# Patient Record
Sex: MALE | Race: OTHER | HISPANIC OR LATINO | URBAN - METROPOLITAN AREA
[De-identification: names, ages, dates, MRNs, and addresses within clinical notes are randomized per-mention and may not be internally consistent; named-entity substitution may affect disease eponyms.]

---

## 2019-07-22 ENCOUNTER — INPATIENT (INPATIENT)
Facility: HOSPITAL | Age: 59
LOS: 1 days | Discharge: HOME | DRG: 683 | End: 2019-07-24
Attending: INTERNAL MEDICINE | Admitting: INTERNAL MEDICINE
Payer: COMMERCIAL

## 2019-07-22 VITALS
HEART RATE: 87 BPM | DIASTOLIC BLOOD PRESSURE: 61 MMHG | OXYGEN SATURATION: 98 % | HEIGHT: 66.93 IN | RESPIRATION RATE: 20 BRPM | SYSTOLIC BLOOD PRESSURE: 85 MMHG | TEMPERATURE: 97 F | WEIGHT: 169.98 LBS

## 2019-07-22 DIAGNOSIS — M62.82 RHABDOMYOLYSIS: ICD-10-CM

## 2019-07-22 DIAGNOSIS — R21 RASH AND OTHER NONSPECIFIC SKIN ERUPTION: ICD-10-CM

## 2019-07-22 DIAGNOSIS — I10 ESSENTIAL (PRIMARY) HYPERTENSION: ICD-10-CM

## 2019-07-22 DIAGNOSIS — D72.829 ELEVATED WHITE BLOOD CELL COUNT, UNSPECIFIED: ICD-10-CM

## 2019-07-22 DIAGNOSIS — R74.0 NONSPECIFIC ELEVATION OF LEVELS OF TRANSAMINASE AND LACTIC ACID DEHYDROGENASE [LDH]: ICD-10-CM

## 2019-07-22 DIAGNOSIS — E87.1 HYPO-OSMOLALITY AND HYPONATREMIA: ICD-10-CM

## 2019-07-22 DIAGNOSIS — N17.9 ACUTE KIDNEY FAILURE, UNSPECIFIED: ICD-10-CM

## 2019-07-22 DIAGNOSIS — Z29.9 ENCOUNTER FOR PROPHYLACTIC MEASURES, UNSPECIFIED: ICD-10-CM

## 2019-07-22 DIAGNOSIS — Z90.49 ACQUIRED ABSENCE OF OTHER SPECIFIED PARTS OF DIGESTIVE TRACT: Chronic | ICD-10-CM

## 2019-07-22 LAB
ALBUMIN SERPL ELPH-MCNC: 4.2 G/DL — SIGNIFICANT CHANGE UP (ref 3.5–5)
ALP SERPL-CCNC: 87 U/L — SIGNIFICANT CHANGE UP (ref 40–120)
ALT FLD-CCNC: 131 U/L DA — HIGH (ref 10–60)
ANION GAP SERPL CALC-SCNC: 10 MMOL/L — SIGNIFICANT CHANGE UP (ref 5–17)
ANION GAP SERPL CALC-SCNC: 12 MMOL/L — SIGNIFICANT CHANGE UP (ref 5–17)
APPEARANCE UR: CLEAR — SIGNIFICANT CHANGE UP
AST SERPL-CCNC: 147 U/L — HIGH (ref 10–40)
BASOPHILS # BLD AUTO: 0.04 K/UL — SIGNIFICANT CHANGE UP (ref 0–0.2)
BASOPHILS NFR BLD AUTO: 0.3 % — SIGNIFICANT CHANGE UP (ref 0–2)
BILIRUB SERPL-MCNC: 1.4 MG/DL — HIGH (ref 0.2–1.2)
BILIRUB UR-MCNC: NEGATIVE — SIGNIFICANT CHANGE UP
BUN SERPL-MCNC: 57 MG/DL — HIGH (ref 7–18)
BUN SERPL-MCNC: 70 MG/DL — HIGH (ref 7–18)
CALCIUM SERPL-MCNC: 8 MG/DL — LOW (ref 8.4–10.5)
CALCIUM SERPL-MCNC: 9.1 MG/DL — SIGNIFICANT CHANGE UP (ref 8.4–10.5)
CHLORIDE SERPL-SCNC: 91 MMOL/L — LOW (ref 96–108)
CHLORIDE SERPL-SCNC: 96 MMOL/L — SIGNIFICANT CHANGE UP (ref 96–108)
CK SERPL-CCNC: 3098 U/L — HIGH (ref 35–232)
CO2 SERPL-SCNC: 24 MMOL/L — SIGNIFICANT CHANGE UP (ref 22–31)
CO2 SERPL-SCNC: 24 MMOL/L — SIGNIFICANT CHANGE UP (ref 22–31)
COLOR SPEC: YELLOW — SIGNIFICANT CHANGE UP
CREAT ?TM UR-MCNC: 112 MG/DL — SIGNIFICANT CHANGE UP
CREAT SERPL-MCNC: 1.74 MG/DL — HIGH (ref 0.5–1.3)
CREAT SERPL-MCNC: 2.57 MG/DL — HIGH (ref 0.5–1.3)
DIFF PNL FLD: ABNORMAL
EOSINOPHIL # BLD AUTO: 0.03 K/UL — SIGNIFICANT CHANGE UP (ref 0–0.5)
EOSINOPHIL NFR BLD AUTO: 0.2 % — SIGNIFICANT CHANGE UP (ref 0–6)
GLUCOSE SERPL-MCNC: 125 MG/DL — HIGH (ref 70–99)
GLUCOSE SERPL-MCNC: 145 MG/DL — HIGH (ref 70–99)
GLUCOSE UR QL: NEGATIVE — SIGNIFICANT CHANGE UP
HCT VFR BLD CALC: 46.8 % — SIGNIFICANT CHANGE UP (ref 39–50)
HGB BLD-MCNC: 16.6 G/DL — SIGNIFICANT CHANGE UP (ref 13–17)
IMM GRANULOCYTES NFR BLD AUTO: 1 % — SIGNIFICANT CHANGE UP (ref 0–1.5)
KETONES UR-MCNC: ABNORMAL
LEUKOCYTE ESTERASE UR-ACNC: ABNORMAL
LIDOCAIN IGE QN: 484 U/L — HIGH (ref 73–393)
LYMPHOCYTES # BLD AUTO: 1.93 K/UL — SIGNIFICANT CHANGE UP (ref 1–3.3)
LYMPHOCYTES # BLD AUTO: 15.7 % — SIGNIFICANT CHANGE UP (ref 13–44)
MCHC RBC-ENTMCNC: 30.7 PG — SIGNIFICANT CHANGE UP (ref 27–34)
MCHC RBC-ENTMCNC: 35.5 GM/DL — SIGNIFICANT CHANGE UP (ref 32–36)
MCV RBC AUTO: 86.7 FL — SIGNIFICANT CHANGE UP (ref 80–100)
MONOCYTES # BLD AUTO: 1.16 K/UL — HIGH (ref 0–0.9)
MONOCYTES NFR BLD AUTO: 9.5 % — SIGNIFICANT CHANGE UP (ref 2–14)
NEUTROPHILS # BLD AUTO: 8.99 K/UL — HIGH (ref 1.8–7.4)
NEUTROPHILS NFR BLD AUTO: 73.3 % — SIGNIFICANT CHANGE UP (ref 43–77)
NITRITE UR-MCNC: NEGATIVE — SIGNIFICANT CHANGE UP
NRBC # BLD: 0 /100 WBCS — SIGNIFICANT CHANGE UP (ref 0–0)
PH UR: 5 — SIGNIFICANT CHANGE UP (ref 5–8)
PLATELET # BLD AUTO: 232 K/UL — SIGNIFICANT CHANGE UP (ref 150–400)
POTASSIUM SERPL-MCNC: 3.1 MMOL/L — LOW (ref 3.5–5.3)
POTASSIUM SERPL-MCNC: 3.1 MMOL/L — LOW (ref 3.5–5.3)
POTASSIUM SERPL-SCNC: 3.1 MMOL/L — LOW (ref 3.5–5.3)
POTASSIUM SERPL-SCNC: 3.1 MMOL/L — LOW (ref 3.5–5.3)
POTASSIUM UR-SCNC: 36 MMOL/L — SIGNIFICANT CHANGE UP (ref 25–125)
PROT ?TM UR-MCNC: 20 MG/DL — HIGH (ref 0–12)
PROT SERPL-MCNC: 8 G/DL — SIGNIFICANT CHANGE UP (ref 6–8.3)
PROT UR-MCNC: 30 MG/DL
RBC # BLD: 5.4 M/UL — SIGNIFICANT CHANGE UP (ref 4.2–5.8)
RBC # FLD: 12.4 % — SIGNIFICANT CHANGE UP (ref 10.3–14.5)
SODIUM SERPL-SCNC: 127 MMOL/L — LOW (ref 135–145)
SODIUM SERPL-SCNC: 130 MMOL/L — LOW (ref 135–145)
SODIUM UR-SCNC: 79 MMOL/L — SIGNIFICANT CHANGE UP (ref 40–220)
SP GR SPEC: 1.02 — SIGNIFICANT CHANGE UP (ref 1.01–1.02)
TROPONIN I SERPL-MCNC: <0.015 NG/ML — SIGNIFICANT CHANGE UP (ref 0–0.04)
UROBILINOGEN FLD QL: NEGATIVE — SIGNIFICANT CHANGE UP
WBC # BLD: 12.27 K/UL — HIGH (ref 3.8–10.5)
WBC # FLD AUTO: 12.27 K/UL — HIGH (ref 3.8–10.5)

## 2019-07-22 PROCEDURE — 99285 EMERGENCY DEPT VISIT HI MDM: CPT

## 2019-07-22 PROCEDURE — 74176 CT ABD & PELVIS W/O CONTRAST: CPT | Mod: 26

## 2019-07-22 PROCEDURE — 76700 US EXAM ABDOM COMPLETE: CPT | Mod: 26

## 2019-07-22 RX ORDER — POTASSIUM CHLORIDE 20 MEQ
40 PACKET (EA) ORAL EVERY 4 HOURS
Refills: 0 | Status: COMPLETED | OUTPATIENT
Start: 2019-07-22 | End: 2019-07-22

## 2019-07-22 RX ORDER — SODIUM CHLORIDE 9 MG/ML
1000 INJECTION INTRAMUSCULAR; INTRAVENOUS; SUBCUTANEOUS
Refills: 0 | Status: DISCONTINUED | OUTPATIENT
Start: 2019-07-22 | End: 2019-07-23

## 2019-07-22 RX ORDER — DIPHENHYDRAMINE HCL 50 MG
25 CAPSULE ORAL EVERY 4 HOURS
Refills: 0 | Status: DISCONTINUED | OUTPATIENT
Start: 2019-07-22 | End: 2019-07-24

## 2019-07-22 RX ORDER — HEPARIN SODIUM 5000 [USP'U]/ML
5000 INJECTION INTRAVENOUS; SUBCUTANEOUS EVERY 12 HOURS
Refills: 0 | Status: DISCONTINUED | OUTPATIENT
Start: 2019-07-22 | End: 2019-07-24

## 2019-07-22 RX ORDER — POTASSIUM CHLORIDE 20 MEQ
40 PACKET (EA) ORAL ONCE
Refills: 0 | Status: COMPLETED | OUTPATIENT
Start: 2019-07-22 | End: 2019-07-22

## 2019-07-22 RX ORDER — CEFTRIAXONE 500 MG/1
1000 INJECTION, POWDER, FOR SOLUTION INTRAMUSCULAR; INTRAVENOUS EVERY 24 HOURS
Refills: 0 | Status: DISCONTINUED | OUTPATIENT
Start: 2019-07-22 | End: 2019-07-22

## 2019-07-22 RX ORDER — SODIUM CHLORIDE 9 MG/ML
2000 INJECTION INTRAMUSCULAR; INTRAVENOUS; SUBCUTANEOUS ONCE
Refills: 0 | Status: COMPLETED | OUTPATIENT
Start: 2019-07-22 | End: 2019-07-22

## 2019-07-22 RX ORDER — CALAMINE AND ZINC OXIDE AND PHENOL 160; 10 MG/ML; MG/ML
1 LOTION TOPICAL EVERY 6 HOURS
Refills: 0 | Status: DISCONTINUED | OUTPATIENT
Start: 2019-07-22 | End: 2019-07-24

## 2019-07-22 RX ADMIN — CEFTRIAXONE 100 MILLIGRAM(S): 500 INJECTION, POWDER, FOR SOLUTION INTRAMUSCULAR; INTRAVENOUS at 15:08

## 2019-07-22 RX ADMIN — SODIUM CHLORIDE 2000 MILLILITER(S): 9 INJECTION INTRAMUSCULAR; INTRAVENOUS; SUBCUTANEOUS at 14:33

## 2019-07-22 RX ADMIN — Medication 40 MILLIEQUIVALENT(S): at 15:09

## 2019-07-22 RX ADMIN — HEPARIN SODIUM 5000 UNIT(S): 5000 INJECTION INTRAVENOUS; SUBCUTANEOUS at 18:04

## 2019-07-22 RX ADMIN — SODIUM CHLORIDE 2000 MILLILITER(S): 9 INJECTION INTRAMUSCULAR; INTRAVENOUS; SUBCUTANEOUS at 13:33

## 2019-07-22 RX ADMIN — Medication 40 MILLIEQUIVALENT(S): at 22:11

## 2019-07-22 RX ADMIN — SODIUM CHLORIDE 150 MILLILITER(S): 9 INJECTION INTRAMUSCULAR; INTRAVENOUS; SUBCUTANEOUS at 22:11

## 2019-07-22 RX ADMIN — Medication 25 MILLIGRAM(S): at 22:10

## 2019-07-22 RX ADMIN — SODIUM CHLORIDE 150 MILLILITER(S): 9 INJECTION INTRAMUSCULAR; INTRAVENOUS; SUBCUTANEOUS at 15:09

## 2019-07-22 RX ADMIN — Medication 40 MILLIEQUIVALENT(S): at 18:04

## 2019-07-22 NOTE — ED PROVIDER NOTE - OBJECTIVE STATEMENT
58 y/o M patient, Korean speakers opts to speak English, BIB EMS to the ED for weakness, lightheadedness and feeling unwell at work today. Patient reports 1 week ago he was in another hospital for muscle aches and back aches, had a CAT scan and was informed he had a UTI. Patient was given 2 abx pills and a prescription to the pharmacy but was not able to fill prescription because the pharmacy was closed. Patient denies urinary symptoms, chest pain or any other complaints. NKDA.

## 2019-07-22 NOTE — ED ADULT NURSE NOTE - NSIMPLEMENTINTERV_GEN_ALL_ED
Implemented All Fall with Harm Risk Interventions:  [place] to call system. Call bell, personal items and telephone within reach. Instruct patient to call for assistance. Room bathroom lighting operational. Non-slip footwear when patient is off stretcher. Physically safe environment: no spills, clutter or unnecessary equipment. Stretcher in lowest position, wheels locked, appropriate side rails in place. Provide visual cue, wrist band, yellow gown, etc. Monitor gait and stability. Monitor for mental status changes and reorient to person, place, and time. Review medications for side effects contributing to fall risk. Reinforce activity limits and safety measures with patient and family. Provide visual clues: red socks.

## 2019-07-22 NOTE — H&P ADULT - NSHPPHYSICALEXAM_GEN_ALL_CORE
PHYSICAL EXAM:  GENERAL: male in bed in no acute distress   HEENT: Normocephalic;  conjunctivae and sclerae clear; moist mucous membranes;   NECK : supple  CHEST/LUNG: Clear to auscultation bilaterally with good air entry   HEART: S1 S2  regular; no murmurs, gallops or rubs  ABDOMEN: Soft, Nontender, Nondistended; Bowel sounds present  EXTREMITIES: no cyanosis; no edema; no calf tenderness  SKIN: warm and dry; rash present over shoulders and back   NERVOUS SYSTEM:  Awake and alert; Oriented  to place, person and time ; no new deficits

## 2019-07-22 NOTE — H&P ADULT - HISTORY OF PRESENT ILLNESS
58 yo male with PMH of HTN presented to the ED for weakness, lightheadedness and feeling unwell at work today.      Patient reports 1 week ago he was in another hospital for muscle aches and back aches, had a CAT scan and was informed he had a UTI. Patient was given 2 abx pills and a prescription to the pharmacy but was not able to fill prescription because the pharmacy was closed. Patient denies urinary symptoms, chest pain or any other complaints. NKDA. 60 yo male with PMH of HTN presented to the ED for weakness, lightheadedness and feeling unwell at work today. He is a , worked out doors over the weekend. He denies any pain in particular. He denies any similar complains in the past.    Patient reports 1 week ago he was in [facility] for muscle aches and back aches, had a CAT scan and was informed he had a UTI. Patient was given 2 abx pills and a prescription to the pharmacy but was not able to fill prescription because the pharmacy was closed. Patient denies urinary symptoms, chest pain or any other complaints. He took Advil for back pain.   He complains of rash which developed this morning. Rash associated with itching present on b/l Shoulders and back.   He was told a year and half ago that he has kidney problem but he does not know his baseline creat.

## 2019-07-22 NOTE — H&P ADULT - PROBLEM SELECTOR PLAN 7
IMPROVE VTE Individual Risk Assessment  RISK                                                                Points  [  ] Previous VTE                                                  3  [  ] Thrombophilia                                               2  [  ] Lower limb paralysis                                      2        (unable to hold up >15 seconds)    [  ] Current Cancer                                              2         (within 6 months)  [x  ] Immobilization > 24 hrs                                1  [  ] ICU/CCU stay > 24 hours                              1  [  ] Age > 60                                                      1  IMPROVE VTE Score _________1, heparin for  for DVT proph - Will hold home meds : Telmisartan-HCTZ 80-25 in settingof Citlalli and dehydration   -Monitor BP

## 2019-07-22 NOTE — H&P ADULT - PROBLEM SELECTOR PLAN 2
- Creat; 2.7   -Most likely pre renal, due to Rhabdo   - Patient has ?H/o CKD   -US kidney: no hydronephrosis   - Monitor BMP,   Urine lytes sent

## 2019-07-22 NOTE — H&P ADULT - PROBLEM SELECTOR PLAN 4
Na: 127, most likely due to dehydration   -s/p 2 L NS in Ed   -Repeat NA; 130  -monitor Na  -Encourage PO intake

## 2019-07-22 NOTE — H&P ADULT - NSHPSOCIALHISTORY_GEN_ALL_CORE
occasional alcohol intake? (Language barrier), last alcohol intake was a month ago   denies substance use occasional alcohol intake, last alcohol intake was a month ago   denies substance use

## 2019-07-22 NOTE — ED PROVIDER NOTE - PROGRESS NOTE DETAILS
Labs reveal rhabdo and renal insufficiency. Patient showed me blood work from hospital visit labs reveal creatinine of 3.3. Patient has been told that something was wrong with his kidneys but he is unsure of details. admit for IV hydration. Patient reports working a lot outdoors recently

## 2019-07-22 NOTE — H&P ADULT - ASSESSMENT
58 yo male with PMH of HTN presented to the ED for weakness, lightheadedness and feeling unwell at work today. He is a , worked out doors over the weekend. He denies any pain in particular. He denies any similar complains in the past.        Admitted for Rhabdomyolysis

## 2019-07-22 NOTE — H&P ADULT - NSHPREVIEWOFSYSTEMS_GEN_ALL_CORE
REVIEW OF SYSTEMS:  CONSTITUTIONAL: No fever,   EYES: no acute visual disturbances  NECK: No pain or stiffness  RESPIRATORY: No cough; No shortness of breath  CARDIOVASCULAR: No chest pain, no palpitations  GASTROINTESTINAL: No pain. No nausea or vomiting; No diarrhea   NEUROLOGICAL: No headache or numbness, no tremors  MUSCULOSKELETAL: No joint pain, no muscle pain  GENITOURINARY: no dysuria, no frequency, no hesitancy  PSYCHIATRY: no depression , no anxiety  ALL OTHER  ROS negative

## 2019-07-22 NOTE — H&P ADULT - PROBLEM SELECTOR PLAN 8
IMPROVE VTE Individual Risk Assessment  RISK                                                                Points  [  ] Previous VTE                                                  3  [  ] Thrombophilia                                               2  [  ] Lower limb paralysis                                      2        (unable to hold up >15 seconds)    [  ] Current Cancer                                              2         (within 6 months)  [x  ] Immobilization > 24 hrs                                1  [  ] ICU/CCU stay > 24 hours                              1  [  ] Age > 60                                                      1  IMPROVE VTE Score _________1, heparin for  for DVT proph

## 2019-07-22 NOTE — H&P ADULT - PROBLEM SELECTOR PLAN 6
LFT: elevated, could be due to Rhabdomyolysis   -CT  abd: Cirrhosis   - denies h/o heavy drinking presently   - Hepatitis Panel testing

## 2019-07-22 NOTE — H&P ADULT - PROBLEM SELECTOR PLAN 1
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   - CPK: 3K   s/p 2 L NS in ED   -IV fluids: 150 cc /ml   - most likely to dehydration as he was working outside over the weekend.   - REpeat CPK in Am   - Will also send Aldolase, ESR, CPK, SHERITA

## 2019-07-22 NOTE — ED ADULT NURSE NOTE - OBJECTIVE STATEMENT
Pt complaining of weakness, altered mental statues for a couple of minutes, blurred vision when he was at work this morning.

## 2019-07-22 NOTE — ED ADULT TRIAGE NOTE - CHIEF COMPLAINT QUOTE
Weakness, cold sweats, fatigue x 20 mins ago. Noted by Co-workers. Dx with Kidney infection x last Saturday. On antibiotics.

## 2019-07-23 LAB
24R-OH-CALCIDIOL SERPL-MCNC: 22.1 NG/ML — LOW (ref 30–80)
ALBUMIN SERPL ELPH-MCNC: 3.1 G/DL — LOW (ref 3.5–5)
ALP SERPL-CCNC: 70 U/L — SIGNIFICANT CHANGE UP (ref 40–120)
ALT FLD-CCNC: 87 U/L DA — HIGH (ref 10–60)
ANION GAP SERPL CALC-SCNC: 7 MMOL/L — SIGNIFICANT CHANGE UP (ref 5–17)
AST SERPL-CCNC: 75 U/L — HIGH (ref 10–40)
BASOPHILS # BLD AUTO: 0.02 K/UL — SIGNIFICANT CHANGE UP (ref 0–0.2)
BASOPHILS NFR BLD AUTO: 0.2 % — SIGNIFICANT CHANGE UP (ref 0–2)
BILIRUB SERPL-MCNC: 0.9 MG/DL — SIGNIFICANT CHANGE UP (ref 0.2–1.2)
BUN SERPL-MCNC: 40 MG/DL — HIGH (ref 7–18)
C3 SERPL-MCNC: 91 MG/DL — SIGNIFICANT CHANGE UP (ref 81–157)
C4 SERPL-MCNC: 25 MG/DL — SIGNIFICANT CHANGE UP (ref 13–39)
CALCIUM SERPL-MCNC: 7.8 MG/DL — LOW (ref 8.4–10.5)
CALCIUM UR-MCNC: 1 MG/DL — SIGNIFICANT CHANGE UP
CHLORIDE SERPL-SCNC: 103 MMOL/L — SIGNIFICANT CHANGE UP (ref 96–108)
CHOLEST SERPL-MCNC: 120 MG/DL — SIGNIFICANT CHANGE UP (ref 10–199)
CK SERPL-CCNC: 939 U/L — HIGH (ref 35–232)
CO2 SERPL-SCNC: 23 MMOL/L — SIGNIFICANT CHANGE UP (ref 22–31)
CREAT SERPL-MCNC: 1.23 MG/DL — SIGNIFICANT CHANGE UP (ref 0.5–1.3)
CRP SERPL-MCNC: 0.28 MG/DL — SIGNIFICANT CHANGE UP (ref 0–0.4)
CULTURE RESULTS: NO GROWTH — SIGNIFICANT CHANGE UP
EOSINOPHIL # BLD AUTO: 0.06 K/UL — SIGNIFICANT CHANGE UP (ref 0–0.5)
EOSINOPHIL NFR BLD AUTO: 0.7 % — SIGNIFICANT CHANGE UP (ref 0–6)
ERYTHROCYTE [SEDIMENTATION RATE] IN BLOOD: 2 MM/HR — SIGNIFICANT CHANGE UP (ref 0–20)
FOLATE SERPL-MCNC: 8.7 NG/ML — SIGNIFICANT CHANGE UP
GLUCOSE SERPL-MCNC: 92 MG/DL — SIGNIFICANT CHANGE UP (ref 70–99)
HAV IGM SER-ACNC: SIGNIFICANT CHANGE UP
HBA1C BLD-MCNC: 5.7 % — HIGH (ref 4–5.6)
HBV CORE IGM SER-ACNC: SIGNIFICANT CHANGE UP
HBV SURFACE AG SER-ACNC: SIGNIFICANT CHANGE UP
HCT VFR BLD CALC: 42.9 % — SIGNIFICANT CHANGE UP (ref 39–50)
HCV AB S/CO SERPL IA: 0.11 S/CO — SIGNIFICANT CHANGE UP (ref 0–0.99)
HCV AB SERPL-IMP: SIGNIFICANT CHANGE UP
HDLC SERPL-MCNC: 63 MG/DL — SIGNIFICANT CHANGE UP
HGB BLD-MCNC: 14.9 G/DL — SIGNIFICANT CHANGE UP (ref 13–17)
IMM GRANULOCYTES NFR BLD AUTO: 1 % — SIGNIFICANT CHANGE UP (ref 0–1.5)
LIDOCAIN IGE QN: 349 U/L — SIGNIFICANT CHANGE UP (ref 73–393)
LIPID PNL WITH DIRECT LDL SERPL: 39 MG/DL — SIGNIFICANT CHANGE UP
LYMPHOCYTES # BLD AUTO: 2.24 K/UL — SIGNIFICANT CHANGE UP (ref 1–3.3)
LYMPHOCYTES # BLD AUTO: 27.9 % — SIGNIFICANT CHANGE UP (ref 13–44)
MAGNESIUM SERPL-MCNC: 2.2 MG/DL — SIGNIFICANT CHANGE UP (ref 1.6–2.6)
MCHC RBC-ENTMCNC: 30.8 PG — SIGNIFICANT CHANGE UP (ref 27–34)
MCHC RBC-ENTMCNC: 34.7 GM/DL — SIGNIFICANT CHANGE UP (ref 32–36)
MCV RBC AUTO: 88.8 FL — SIGNIFICANT CHANGE UP (ref 80–100)
MONOCYTES # BLD AUTO: 0.91 K/UL — HIGH (ref 0–0.9)
MONOCYTES NFR BLD AUTO: 11.3 % — SIGNIFICANT CHANGE UP (ref 2–14)
NEUTROPHILS # BLD AUTO: 4.72 K/UL — SIGNIFICANT CHANGE UP (ref 1.8–7.4)
NEUTROPHILS NFR BLD AUTO: 58.9 % — SIGNIFICANT CHANGE UP (ref 43–77)
NRBC # BLD: 0 /100 WBCS — SIGNIFICANT CHANGE UP (ref 0–0)
OSMOLALITY UR: 715 MOSM/KG — SIGNIFICANT CHANGE UP (ref 50–1200)
PHOSPHATE SERPL-MCNC: 1.6 MG/DL — LOW (ref 2.5–4.5)
PLATELET # BLD AUTO: 174 K/UL — SIGNIFICANT CHANGE UP (ref 150–400)
POTASSIUM SERPL-MCNC: 3.7 MMOL/L — SIGNIFICANT CHANGE UP (ref 3.5–5.3)
POTASSIUM SERPL-SCNC: 3.7 MMOL/L — SIGNIFICANT CHANGE UP (ref 3.5–5.3)
PROT SERPL-MCNC: 6.3 G/DL — SIGNIFICANT CHANGE UP (ref 6–8.3)
RBC # BLD: 4.83 M/UL — SIGNIFICANT CHANGE UP (ref 4.2–5.8)
RBC # FLD: 12.5 % — SIGNIFICANT CHANGE UP (ref 10.3–14.5)
SODIUM SERPL-SCNC: 133 MMOL/L — LOW (ref 135–145)
SPECIMEN SOURCE: SIGNIFICANT CHANGE UP
TOTAL CHOLESTEROL/HDL RATIO MEASUREMENT: 1.9 RATIO — LOW (ref 3.4–9.6)
TRIGL SERPL-MCNC: 91 MG/DL — SIGNIFICANT CHANGE UP (ref 10–149)
TSH SERPL-MCNC: 3.08 UU/ML — SIGNIFICANT CHANGE UP (ref 0.34–4.82)
URATE UR-MCNC: 64.7 MG/DL — SIGNIFICANT CHANGE UP
UUN UR-MCNC: 1131 MG/DL — SIGNIFICANT CHANGE UP
VIT B12 SERPL-MCNC: >2000 PG/ML — HIGH (ref 232–1245)
WBC # BLD: 8.03 K/UL — SIGNIFICANT CHANGE UP (ref 3.8–10.5)
WBC # FLD AUTO: 8.03 K/UL — SIGNIFICANT CHANGE UP (ref 3.8–10.5)

## 2019-07-23 RX ORDER — POTASSIUM CHLORIDE 20 MEQ
20 PACKET (EA) ORAL ONCE
Refills: 0 | Status: COMPLETED | OUTPATIENT
Start: 2019-07-23 | End: 2019-07-23

## 2019-07-23 RX ORDER — POTASSIUM PHOSPHATE, MONOBASIC POTASSIUM PHOSPHATE, DIBASIC 236; 224 MG/ML; MG/ML
15 INJECTION, SOLUTION INTRAVENOUS ONCE
Refills: 0 | Status: COMPLETED | OUTPATIENT
Start: 2019-07-23 | End: 2019-07-23

## 2019-07-23 RX ORDER — SODIUM CHLORIDE 9 MG/ML
1000 INJECTION INTRAMUSCULAR; INTRAVENOUS; SUBCUTANEOUS
Refills: 0 | Status: DISCONTINUED | OUTPATIENT
Start: 2019-07-23 | End: 2019-07-24

## 2019-07-23 RX ORDER — ERGOCALCIFEROL 1.25 MG/1
50000 CAPSULE ORAL
Refills: 0 | Status: DISCONTINUED | OUTPATIENT
Start: 2019-07-23 | End: 2019-07-24

## 2019-07-23 RX ORDER — SODIUM CHLORIDE 9 MG/ML
1000 INJECTION INTRAMUSCULAR; INTRAVENOUS; SUBCUTANEOUS
Refills: 0 | Status: DISCONTINUED | OUTPATIENT
Start: 2019-07-23 | End: 2019-07-23

## 2019-07-23 RX ADMIN — SODIUM CHLORIDE 150 MILLILITER(S): 9 INJECTION INTRAMUSCULAR; INTRAVENOUS; SUBCUTANEOUS at 08:55

## 2019-07-23 RX ADMIN — POTASSIUM PHOSPHATE, MONOBASIC POTASSIUM PHOSPHATE, DIBASIC 62.5 MILLIMOLE(S): 236; 224 INJECTION, SOLUTION INTRAVENOUS at 11:02

## 2019-07-23 RX ADMIN — Medication 20 MILLIEQUIVALENT(S): at 11:03

## 2019-07-23 RX ADMIN — SODIUM CHLORIDE 150 MILLILITER(S): 9 INJECTION INTRAMUSCULAR; INTRAVENOUS; SUBCUTANEOUS at 05:39

## 2019-07-23 RX ADMIN — HEPARIN SODIUM 5000 UNIT(S): 5000 INJECTION INTRAVENOUS; SUBCUTANEOUS at 05:39

## 2019-07-23 RX ADMIN — ERGOCALCIFEROL 50000 UNIT(S): 1.25 CAPSULE ORAL at 18:12

## 2019-07-23 RX ADMIN — HEPARIN SODIUM 5000 UNIT(S): 5000 INJECTION INTRAVENOUS; SUBCUTANEOUS at 18:12

## 2019-07-23 NOTE — CONSULT NOTE ADULT - ASSESSMENT
1. Atelectasis  - Bronchodilators PRN  - Incentive Spirometry  - Chest PT  - PFTs as OP.     2. Rhabdomyolysis  - Likely 2nd to dehydration   - CK improved from 3098 to 939.  - BUN/Creat improved.   - Cont. IVF  - Monitor labs.   - Avoid nephrotoxic drugs.   - DVT and GI PPX     3. Elevated LFTs  - Monitor LFTs   - CT noted; R/O cirrhosis  - US hepatic to further eval.   - Cont. fluids
A/P:  1.DEE: secondary to pre-renal azotemia, secondary to arb medication with hctz plus hemodynamic related injury from Advil while pt is having dehydration  -pts renal function is improving with current care  -suggest to continue iv hydration  R/O CKD ,secondary to htn with mild proteinuria  -needs outpatient renal f/u  -hold Thelmasartan/hctz for now  -may add another anti htn medication if bp goes high   -Keep patient euvolemic and renal diet  -Avoid Nephrotoxic Meds/ Agents such as (NSAIDs, IV contrast, Aminoglycosides such as gentamicin, -Gadolinium contrast, Phosphate containing enemas, etc..)  -Adjust Medications according to eGFR  -f/u bmp daily  2.HYPONATREMIA: secondary to Hctz , now improving ,less other cause   -f/u Na level daily  -avoid Hctz   3.HYPOKALEMIA: Secondary to hctz, now improved  -avoid Hctz  4.HX OF HTN: currently bp is low normal  -keep bp >110/70 and <130/80  5.NODULAR LIVER/FATTY LIVER : with abnormal LFTs   -suggest GI consult

## 2019-07-23 NOTE — PROGRESS NOTE ADULT - PROBLEM SELECTOR PLAN 1
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   - CPK: 3K -->939  s/p 2 L NS in ED   -IV fluids started with 150 , cw 150/hr  - REpeat CPK in Am   - []FU Aldolase, ESR, CPK, SHERITA

## 2019-07-23 NOTE — PROGRESS NOTE ADULT - SUBJECTIVE AND OBJECTIVE BOX
Patient is a 59y old  Male who presents with a chief complaint of generalized body weakness (23 Jul 2019 13:24)      INTERVAL HPI/OVERNIGHT EVENTS: pt seen and examined, no overnight acute events, symptomatically back to his baseline. no urinary retention or constipation.         REVIEW OF SYSTEMS:  CONSTITUTIONAL: No fever, weight loss, or fatigue  EYES: No eye pain, visual disturbances, or discharge  ENMT:  No difficulty hearing, tinnitus, vertigo; No sinus or throat pain  NECK: No pain or stiffness  BREASTS: No pain, masses, or nipple discharge  RESPIRATORY: No cough, wheezing, chills or hemoptysis; No shortness of breath  CARDIOVASCULAR: No chest pain, palpitations, dizziness, or leg swelling  GASTROINTESTINAL: No abdominal or epigastric pain. No nausea, vomiting, or hematemesis; No diarrhea or constipation. No melena or hematochezia.  GENITOURINARY: No dysuria, frequency, hematuria, or incontinence  NEUROLOGICAL: No headaches, memory loss, loss of strength, numbness, or tremors  SKIN: No itching, burning, rashes, or lesions   LYMPH NODES: No enlarged glands  ENDOCRINE: No heat or cold intolerance; No hair loss  MUSCULOSKELETAL: No joint pain or swelling; No muscle, back, or extremity pain  HEME/LYMPH: No easy bruising, or bleeding gums  ALLERY AND IMMUNOLOGIC: No hives or eczema    FAMILY HISTORY:  No pertinent family history in first degree relatives    Vital Signs Last 24 Hrs  T(C): 36.4 (23 Jul 2019 08:04), Max: 36.8 (22 Jul 2019 15:50)  T(F): 97.6 (23 Jul 2019 08:04), Max: 98.2 (22 Jul 2019 15:50)  HR: 67 (23 Jul 2019 08:04) (67 - 79)  BP: 104/65 (23 Jul 2019 08:04) (100/67 - 127/80)  BP(mean): --  RR: 16 (23 Jul 2019 08:04) (16 - 16)  SpO2: 100% (23 Jul 2019 08:04) (100% - 100%)      PHYSICAL EXAM:  GENERAL: NAD, well-groomed, well-developed  HEAD:  Atraumatic, Normocephalic  EYES: EOMI, PERRLA, conjunctiva and sclera clear  ENMT: No tonsillar erythema, exudates, or enlargement; Moist mucous membranes, Good dentition, No lesions  NECK: Supple, No JVD, Normal thyroid  NERVOUS SYSTEM:  Alert & Oriented X3, Good concentration; Motor Strength 5/5 B/L upper and lower extremities; DTRs 2+ intact and symmetric  CHEST/LUNG: Clear to percussion bilaterally; No rales, rhonchi, wheezing, or rubs  HEART: Regular rate and rhythm; No murmurs, rubs, or gallops  ABDOMEN: Soft, Nontender, Nondistended; Bowel sounds present  EXTREMITIES:  2+ Peripheral Pulses, No clubbing, cyanosis, or edema  LYMPH: No lymphadenopathy noted  SKIN: No rashes or lesions    Consultant(s) Notes Reviewed:  [x ] YES  [ ] NO  Care Discussed with Consultants/Other Providers [ x] YES  [ ] NO    LABS:                        14.9   8.03  )-----------( 174      ( 23 Jul 2019 06:42 )             42.9   07-23    133<L>  |  103  |  40<H>  ----------------------------<  92  3.7   |  23  |  1.23    Ca    7.8<L>      23 Jul 2019 06:42  Phos  1.6     07-23  Mg     2.2     07-23    TPro  6.3  /  Alb  3.1<L>  /  TBili  0.9  /  DBili  x   /  AST  75<H>  /  ALT  87<H>  /  AlkPhos  70  07-23          RADIOLOGY & ADDITIONAL TESTS:  < from: CT Abdomen and Pelvis No Cont (07.22.19 @ 14:46) >  Findings: Limited sections through the lung bases demonstrate small left   atelectasis.    The examination is limited by lack of IV and oral contrast   Cholecystectomy clips are present. Nodular contour of the liver,   suggestive of cirrhosis. Allowing for the noncontrast technique, the   common bile duct, pancreas, spleen, adrenals and kidneys appear grossly   unremarkable.    The appendix appears normal. Small collapsed hiatal hernia versus   possible distal esophageal mural thickening. EGD may be pursued for   further evaluation. No bowel obstruction.    No evidence for free air, ascites, or enlarged lymph node.    The urinary bladder appears grossly unremarkable. The prostate is mildly   enlarged.    Impression: No CT evidence for acute pancreatitis.    Nodular contour of the liver, suggestive of cirrhosis.     Small collapsed hiatal hernia versus possible distal esophageal mural   thickening. EGD may be pursued for further evaluation.     < from: US Abdomen Complete (07.22.19 @ 15:58) >  IMPRESSION:  Fatty liver      Imaging Personally Reviewed:  [x ] YES  [ ] NO  calamine Lotion 1 Application(s) Topical every 6 hours PRN  diphenhydrAMINE 25 milliGRAM(s) Oral every 4 hours PRN  heparin  Injectable 5000 Unit(s) SubCutaneous every 12 hours  sodium chloride 0.9%. 1000 milliLiter(s) IV Continuous <Continuous>      HEALTH ISSUES - PROBLEM Dx:  Hypertension, unspecified type: Hypertension, unspecified type  Rash: Rash  Prophylactic measure: Prophylactic measure  Transaminitis: Transaminitis  Elevated WBC count: Elevated WBC count  Hyponatremia: Hyponatremia  DEE (acute kidney injury): DEE (acute kidney injury)  Rhabdomyolysis: Rhabdomyolysis

## 2019-07-23 NOTE — PROGRESS NOTE ADULT - SUBJECTIVE AND OBJECTIVE BOX
60 yo male with PMH of HTN presented to the ED for weakness, lightheadedness and feeling unwell at work today. He is a , worked out doors over the weekend. He denies any pain in particular. He denies any similar complains in the past.    Patient reports 1 week ago he was in [facility] for muscle aches and back aches, had a CAT scan and was informed he had a UTI. Patient was given 2 abx pills and a prescription to the pharmacy but was not able to fill prescription because the pharmacy was closed. Patient denies urinary symptoms, chest pain or any other complaints. He took Advil for back pain.   He complains of rash which developed this morning. Rash associated with itching present on b/l Shoulders and back.   He was told a year and half ago that he has kidney problem but he does not know his baseline creat.     pt seen in icu [  ], reg med floor [ x  ], bed [ x ], chair at bedside [   ], a+o x3 [ x ], lethargic [  ],  nad [  ]          Allergies    No Known Allergies        Vitals    T(F): 97.6 (07-23-19 @ 08:04), Max: 98.2 (07-22-19 @ 15:50)  HR: 67 (07-23-19 @ 08:04) (67 - 79)  BP: 104/65 (07-23-19 @ 08:04) (100/67 - 127/80)  RR: 16 (07-23-19 @ 08:04) (16 - 16)  SpO2: 100% (07-23-19 @ 08:04) (100% - 100%)  Wt(kg): --  CAPILLARY BLOOD GLUCOSE          Labs                          14.9   8.03  )-----------( 174      ( 23 Jul 2019 06:42 )             42.9       07-23    133<L>  |  103  |  40<H>  ----------------------------<  92  3.7   |  23  |  1.23    Ca    7.8<L>      23 Jul 2019 06:42  Phos  1.6     07-23  Mg     2.2     07-23    TPro  6.3  /  Alb  3.1<L>  /  TBili  0.9  /  DBili  x   /  AST  75<H>  /  ALT  87<H>  /  AlkPhos  70  07-23      CARDIAC MARKERS ( 23 Jul 2019 06:42 )  x     / x     / 939 U/L / x     / x      CARDIAC MARKERS ( 22 Jul 2019 11:08 )  <0.015 ng/mL / x     / 3098 U/L / x     / x                Radiology Results    < from: US Abdomen Complete (07.22.19 @ 15:58) >  IMPRESSION:  Fatty liver    < end of copied text >      < from: CT Abdomen and Pelvis No Cont (07.22.19 @ 14:46) >    Impression: No CT evidence for acute pancreatitis.    Nodular contour of the liver, suggestive of cirrhosis.     Small collapsed hiatal hernia versus possible distal esophageal mural   thickening. EGD may be pursued for further evaluation.     < end of copied text >          Meds    MEDICATIONS  (STANDING):  heparin  Injectable 5000 Unit(s) SubCutaneous every 12 hours  sodium chloride 0.9%. 1000 milliLiter(s) (150 mL/Hr) IV Continuous <Continuous>      MEDICATIONS  (PRN):  calamine Lotion 1 Application(s) Topical every 6 hours PRN Itching  diphenhydrAMINE 25 milliGRAM(s) Oral every 4 hours PRN Rash and/or Itching      Physical Exam    Neuro :  no focal deficits  Respiratory: CTA B/L  CV: RRR, S1S2, no murmurs,   Abdominal: Soft, NT, ND +BS,  Extremities: No edema, + peripheral pulses    ASSESSMENT    Rhabdomyolysis possibly 2nd to dehydration and exertion   harriet resolved   h/o Hypertension, unspecified type  S/P cholecystectomy        PLAN      cont IV fluids  most likely to dehydration as he was working outside over the weekend.   CK improving   ast/alt improving   ct abd noted  abd us noted   bp control  electrolyte management  continue current meds

## 2019-07-23 NOTE — PROGRESS NOTE ADULT - PROBLEM SELECTOR PLAN 2
- Creat; 2.7 -->1.23  -Most likely was pre renal, due to Rhabdo   -US kidney: no hydronephrosis   - Monitor BMP,   Urine lytes noted  Nephro consult: recom GI consult as per abnormal LFT , hold Thelmasartan/hctz for now  -may add another anti htn medication if bp goes high , like Norvasc

## 2019-07-23 NOTE — CONSULT NOTE ADULT - SUBJECTIVE AND OBJECTIVE BOX
PULMONARY CONSULT NOTE      KRISTEN ZELAYA  MRN-[id number]    Patient is a 59y old  Male who presents with a chief complaint of generalized body weakness (2019 10:06)      HISTORY OF PRESENT ILLNESS:  History of Present Illness:  Reason for Admission: generalized body weakness	  History of Present Illness: 	  58 yo male with PMH of HTN presented to the ED for weakness, lightheadedness and feeling unwell at work today. He is a , worked out doors over the weekend. He denies any pain in particular. He denies any similar complains in the past.    Patient reports 1 week ago he was in [facility] for muscle aches and back aches, had a CAT scan and was informed he had a UTI. Patient was given 2 abx pills and a prescription to the pharmacy but was not able to fill prescription because the pharmacy was closed. Patient denies urinary symptoms, chest pain or any other complaints. He took Advil for back pain.   He complains of rash which developed this morning. Rash associated with itching present on b/l Shoulders and back.   He was told a year and half ago that he has kidney problem but he does not know his baseline creat.       Pt is awake, alert, lying in bed in NAD. Denies smoking hx, denies hx of asthma/COPD. Reports he was having difficulty due to heat and weakness taking full deep breaths.     MEDICATIONS  (STANDING):  heparin  Injectable 5000 Unit(s) SubCutaneous every 12 hours  sodium chloride 0.9%. 1000 milliLiter(s) (150 mL/Hr) IV Continuous <Continuous>      MEDICATIONS  (PRN):  calamine Lotion 1 Application(s) Topical every 6 hours PRN Itching  diphenhydrAMINE 25 milliGRAM(s) Oral every 4 hours PRN Rash and/or Itching      Allergies    No Known Allergies    Intolerances        PAST MEDICAL & SURGICAL HISTORY:  Hypertension, unspecified type  S/P cholecystectomy      FAMILY HISTORY:  No pertinent family history in first degree relatives      SOCIAL HISTORY  Smoking History:     REVIEW OF SYSTEMS:    CONSTITUTIONAL:  No fevers, chills, sweats    HEENT:  Eyes:  No diplopia or blurred vision. ENT:  No earache, sore throat or runny nose.    CARDIOVASCULAR:  No pressure, squeezing, tightness, or heaviness about the chest; no palpitations.    RESPIRATORY:  Per HPI    GASTROINTESTINAL:  No abdominal pain, nausea, vomiting or diarrhea.    GENITOURINARY:  No dysuria, frequency or urgency.    NEUROLOGIC:  No paresthesias, fasciculations, seizures or weakness.    PSYCHIATRIC:  No disorder of thought or mood.    Vital Signs Last 24 Hrs  T(C): 36.4 (2019 08:04), Max: 36.8 (2019 15:50)  T(F): 97.6 (2019 08:04), Max: 98.2 (2019 15:50)  HR: 67 (2019 08:04) (67 - 79)  BP: 104/65 (2019 08:04) (100/67 - 127/80)  BP(mean): --  RR: 16 (2019 08:04) (16 - 16)  SpO2: 100% (2019 08:04) (100% - 100%)  I&O's Detail      PHYSICAL EXAMINATION:    GENERAL: The patient is a well-developed, well-nourished _____in no apparent distress.     HEENT: Head is normocephalic and atraumatic. Extraocular muscles are intact. Mucous membranes are moist.     NECK: Supple.     LUNGS: Clear to auscultation without wheezing, rales, or rhonchi. Respirations unlabored    HEART: Regular rate and rhythm without murmur.    ABDOMEN: Soft, nontender, and nondistended.  No hepatosplenomegaly is noted.    EXTREMITIES: Without any cyanosis, clubbing, rash, lesions or edema.    NEUROLOGIC: Grossly intact.      LABS:                        14.9   8.03  )-----------( 174      ( 2019 06:42 )             42.9     07-23  Comprehensive Metabolic Panel (19 @ 11:08)    Creatinine, Serum: 2.57 mg/dL    Creatine Kinase, Serum (19 @ 11:08)    Creatine Kinase, Serum: 3098 U/L    Basic Metabolic Panel - STAT (19 @ 18:15)    Creatinine, Serum: 1.74 mg/dL    Creatinine, Random Urine (19 @ 18:52)    Creatinine, Random Urine: 112: Reference Ranges have NOT been established for random urine analytes due  to variability in fluid intake and concentration. mg/dL    Comprehensive Metabolic Panel (19 @ 06:42)    Creatinine, Serum: 1.23 mg/dL    Creatine Kinase, Serum in AM (19 @ 06:42)    Creatine Kinase, Serum: 939 U/L      133<L>  |  103  |  40<H>  ----------------------------<  92  3.7   |  23  |  1.23    Ca    7.8<L>      2019 06:42  Phos  1.6       Mg     2.2         TPro  6.3  /  Alb  3.1<L>  /  TBili  0.9  /  DBili  x   /  AST  75<H>  /  ALT  87<H>  /  AlkPhos  70        Urinalysis Basic - ( 2019 11:32 )    Color: Yellow / Appearance: Clear / S.020 / pH: x  Gluc: x / Ketone: Trace  / Bili: Negative / Urobili: Negative   Blood: x / Protein: 30 mg/dL / Nitrite: Negative   Leuk Esterase: Trace / RBC: 0-2 /HPF / WBC 3-5 /HPF   Sq Epi: x / Non Sq Epi: Few /HPF / Bacteria: Few /HPF        CARDIAC MARKERS ( 2019 06:42 )  x     / x     / 939 U/L / x     / x      CARDIAC MARKERS ( 2019 11:08 )  <0.015 ng/mL / x     / 3098 U/L / x     / x                    MICROBIOLOGY:    RADIOLOGY & ADDITIONAL STUDIES:    CXR:    Ct scan chest:    < from: CT Abdomen and Pelvis No Cont (19 @ 14:46) >  Findings: Limited sections through the lung bases demonstrate small left   atelectasis.    < end of copied text >    < from: CT Abdomen and Pelvis No Cont (19 @ 14:46) >  Impression: No CT evidence for acute pancreatitis.    Nodular contour of the liver, suggestive of cirrhosis.     Small collapsed hiatal hernia versus possible distal esophageal mural   thickening. EGD may be pursued for further evaluation.       < end of copied text >    ekg;    echo:

## 2019-07-23 NOTE — CONSULT NOTE ADULT - SUBJECTIVE AND OBJECTIVE BOX
Patient is a 59y Male whom presented to the hospital with     HPI:  60 yo male with PMH of HTN presented to the ED for weakness, lightheadedness and feeling unwell at work today. He is a , worked out doors over the weekend. He denies any pain in particular. He denies any similar complains in the past.    Patient reports 1 week ago he was in [facility] for muscle aches and back aches, had a CAT scan and was informed he had a UTI. Patient was given 2 abx pills and a prescription to the pharmacy but was not able to fill prescription because the pharmacy was closed. Patient denies urinary symptoms, chest pain or any other complaints. He took Advil for back pain.   He complains of rash which developed this morning. Rash associated with itching present on b/l Shoulders and back.   He was told a year and half ago that he has kidney problem but he does not know his baseline creat. (2019 14:20)    pts current chart reviewed and case discussed with resident  pt denies pmh of renal ds, proteinuria, renal stones, recurrent uti or nephrotic edema or nephrotic syndrome  pt give pmh of retinopathy and s/p laser treatment  pt denies Nsaids use or otc other nephrotoxic supplements  PAST MEDICAL & SURGICAL HISTORY:  Hypertension, unspecified type  S/P cholecystectomy      Home Medications: Reviewed    MEDICATIONS  (STANDING):  heparin  Injectable 5000 Unit(s) SubCutaneous every 12 hours  sodium chloride 0.9%. 1000 milliLiter(s) (150 mL/Hr) IV Continuous <Continuous>    MEDICATIONS  (PRN):  calamine Lotion 1 Application(s) Topical every 6 hours PRN Itching  diphenhydrAMINE 25 milliGRAM(s) Oral every 4 hours PRN Rash and/or Itching      Allergies    No Known Allergies    Intolerances        SOCIAL HISTORY:  Alcohol use [X ] No  [ ] Yes  Smoking  [ X] No  [ ] Yes  Drug Abuse [X ] No  [ ] Yes  Tattoo [X ] No  [ ] Yes  History of Blood transfusion [ X] No  [ ] Yes    FAMILY HISTORY:  No pertinent family history in first degree relatives      Diabetes [ ]  Hypertension [ ]  Kidney Stones [ ]  Heart Disease [ ]  Hyperlipidemia [ ]  Cancer [ ]    REVIEW OF SYSTEMS:  CONSTITUTIONAL: No weakness, fevers or chills  EYES/ENT: No visual changes;  No vertigo or throat pain   NECK: No pain or stiffness  RESPIRATORY: No cough, wheezing, hemoptysis; No shortness of breath  CARDIOVASCULAR: No chest pain or palpitations  GASTROINTESTINAL: No abdominal or epigastric pain. No nausea, vomiting, or hematemesis; No diarrhea or constipation. No melena or hematochezia.  GENITOURINARY: No dysuria, frequency or hematuria  NEUROLOGICAL: No numbness or weakness  SKIN: No itching, burning, rashes, or lesions   All other review of systems is negative unless indicated above    Vital Signs  T(F): 97.6 (19 @ 08:04), Max: 98.2 (19 @ 15:50)  HR: 67 (19 @ 08:04) (67 - 79)  BP: 104/65 (19 @ 08:04) (100/67 - 127/80)  ABP: --  RR: 16 (19 @ 08:04) (16 - 16)  SpO2: 100% (19 @ 08:04) (100% - 100%)  Wt(kg): --  CVP(cm H2O): --  CO: --  PCWP: --    I and O's:    Daily     Daily     PHYSICAL EXAM:  Constitutional: well developed, well nourished  and in nad  HEENT: PERRLA,  no icteric sclera and mild pallor of conjunctiva noted  Neck: No JVD, thyromegaly or adenopathy  Respiratory: reduced air entry lower lungs with no rales, wheezing or rhonchi  Cardiovascular: S1 and S2 normally heard  Gastrointestinal: soft, nondistended, nontender and normal bowel sounds heard  Extremities: No peripheral edema or cyanosis  Neurological: A/O x 3, no focal deficits  Psychiatric: Normal mood, normal affect  : No flank tenderness  Skin: No rashes        LABS:                        14.9   8.03  )-----------( 174      ( 2019 06:42 )             42.9     1.6  --            133<L>  |  103  |  40<H>  ----------------------------<  92  3.7   |  23  |  1.23      130<L>  |  96  |  57<H>  ----------------------------<  145<H>  3.1<L>   |  24  |  1.74<H>      127<L>  |  91<L>  |  70<H>  ----------------------------<  125<H>  3.1<L>   |  24  |  2.57<H>    Ca    7.8<L>      2019 06:42  Ca    8.0<L>      2019 18:15  Ca    9.1      2019 11:08  Phos  1.6       Mg     2.2         TPro  6.3  /  Alb  3.1<L>  /  TBili  0.9  /  DBili  x   /  AST  75<H>  /  ALT  87<H>  /  AlkPhos  70    TPro  8.0  /  Alb  4.2  /  TBili  1.4<H>  /  DBili  x   /  AST  147<H>  /  ALT  131<H>  /  AlkPhos  87            URINE STUDIES:  Urinalysis Basic - ( 2019 11:32 )    Color: Yellow / Appearance: Clear / S.020 / pH: x  Gluc: x / Ketone: Trace  / Bili: Negative / Urobili: Negative   Blood: x / Protein: 30 mg/dL / Nitrite: Negative   Leuk Esterase: Trace / RBC: 0-2 /HPF / WBC 3-5 /HPF   Sq Epi: x / Non Sq Epi: Few /HPF / Bacteria: Few /HPF        Calcium, Random Urine: 1.0 mg/dL (19 @ 04:16)  Osmolality, Random Urine: 715 mosm/Kg (19 @ 18:52)  Sodium, Random Urine: 79 mmol/L (19 @ 18:52)  Potassium, Random Urine: 36 mmol/L (19 @ 18:52)  Creatinine, Random Urine: 112 mg/dL (19 @ 18:52)                RADIOLOGY & ADDITIONAL STUDIES: Patient is a 59y Male whom presented to the hospital with weakness and lightheadedness, noted to have high cpk level and abormal liver and renal function.    Medical HPI:  58 yo male with PMH of HTN presented to the ED for weakness, lightheadedness and feeling unwell at work today. He is a , worked out doors over the weekend. He denies any pain in particular. He denies any similar complains in the past.    Patient reports 1 week ago he was in [facility] for muscle aches and back aches, had a CAT scan and was informed he had a UTI. Patient was given 2 abx pills and a prescription to the pharmacy but was not able to fill prescription because the pharmacy was closed. Patient denies urinary symptoms, chest pain or any other complaints. He took Advil for back pain.   He complains of rash which developed this morning. Rash associated with itching present on b/l Shoulders and back.   He was told a year and half ago that he has kidney problem but he does not know his baseline creat. (2019 14:20)  Renal HPI:  pts current chart reviewed and case discussed with resident  Pt may have some kidney problem but he is not aware of cr level or nature of kidney problem  pt denies pmh of  proteinuria, renal stones, recurrent uti or nephrotic edema or nephrotic syndrome  pt was using Advil for pain but denies using  otc other nephrotoxic supplements recently  pt currently voiding well and denies cp,sob,gi symptoms,gu symptoms ,skin rash ,joint pain, leg edema ,wt loss or gross hematuria    PAST MEDICAL & SURGICAL HISTORY:  Hypertension, unspecified type  S/P cholecystectomy      Home Medications: Reviewed    MEDICATIONS  (STANDING):  heparin  Injectable 5000 Unit(s) SubCutaneous every 12 hours  sodium chloride 0.9%. 1000 milliLiter(s) (150 mL/Hr) IV Continuous <Continuous>    MEDICATIONS  (PRN):  calamine Lotion 1 Application(s) Topical every 6 hours PRN Itching  diphenhydrAMINE 25 milliGRAM(s) Oral every 4 hours PRN Rash and/or Itching      Allergies    No Known Allergies    Intolerances        SOCIAL HISTORY:  Alcohol use  ] No  [ x] Yes more in the past but not much recently  Smoking  [ X] No  [ ] Yes  Drug Abuse [X ] No  [ ] Yes  Tattoo [X ] No  [ ] Yes  History of Blood transfusion [ X] No  [ ] Yes    FAMILY HISTORY:  No pertinent family history in first degree relatives        REVIEW OF SYSTEMS:  CONSTITUTIONAL: No weakness, fevers or chills  EYES/ENT: No visual changes;  No vertigo or throat pain   NECK: No pain or stiffness  RESPIRATORY: No cough, wheezing, hemoptysis; No shortness of breath  CARDIOVASCULAR: No chest pain or palpitations  GASTROINTESTINAL: No abdominal or epigastric pain. No nausea, vomiting, or hematemesis; No diarrhea or constipation. No melena or hematochezia.  GENITOURINARY: No dysuria, frequency or hematuria  NEUROLOGICAL: No numbness or weakness  SKIN: No itching, burning, rashes, or lesions   All other review of systems is negative unless indicated above    Vital Signs  T(F): 97.6 (19 @ 08:04), Max: 98.2 (19 @ 15:50)  HR: 67 (19 @ 08:04) (67 - 79)  BP: 104/65 (19 @ 08:04) (100/67 - 127/80)  ABP: --  RR: 16 (19 @ 08:04) (16 - 16)  SpO2: 100% (19 @ 08:04) (100% - 100%)  Wt(kg): --  CVP(cm H2O): --  CO: --  PCWP: --    I and O's:    Daily     Daily     PHYSICAL EXAM:  Constitutional: well developed, well nourished  and in nad  HEENT: PERRLA,  no icteric sclera and mild pallor of conjunctiva noted  Neck: No JVD, thyromegaly or adenopathy  Respiratory: reduced air entry lower lungs with no rales, wheezing or rhonchi  Cardiovascular: S1 and S2 normally heard  Gastrointestinal: soft, nondistended, nontender and normal bowel sounds heard  Extremities: No peripheral edema or cyanosis  Neurological: A/O x 3, no focal deficits  Psychiatric: Normal mood, normal affect  : No flank tenderness  Skin: No rashes        LABS:                        14.9   8.03  )-----------( 174      ( 2019 06:42 )             42.9     1.6  --            133<L>  |  103  |  40<H>  ----------------------------<  92  3.7   |  23  |  1.23      130<L>  |  96  |  57<H>  ----------------------------<  145<H>  3.1<L>   |  24  |  1.74<H>      127<L>  |  91<L>  |  70<H>  ----------------------------<  125<H>  3.1<L>   |  24  |  2.57<H>    Ca    7.8<L>      2019 06:42  Ca    8.0<L>      2019 18:15  Ca    9.1      2019 11:08  Phos  1.6       Mg     2.2         TPro  6.3  /  Alb  3.1<L>  /  TBili  0.9  /  DBili  x   /  AST  75<H>  /  ALT  87<H>  /  AlkPhos  70    TPro  8.0  /  Alb  4.2  /  TBili  1.4<H>  /  DBili  x   /  AST  147<H>  /  ALT  131<H>  /  AlkPhos  87            URINE STUDIES:  Urinalysis Basic - ( 2019 11:32 )    Color: Yellow / Appearance: Clear / S.020 / pH: x  Gluc: x / Ketone: Trace  / Bili: Negative / Urobili: Negative   Blood: x / Protein: 30 mg/dL / Nitrite: Negative   Leuk Esterase: Trace / RBC: 0-2 /HPF / WBC 3-5 /HPF   Sq Epi: x / Non Sq Epi: Few /HPF / Bacteria: Few /HPF        Calcium, Random Urine: 1.0 mg/dL (19 @ 04:16)  Osmolality, Random Urine: 715 mosm/Kg (19 @ 18:52)  Sodium, Random Urine: 79 mmol/L (19 @ 18:52)  Potassium, Random Urine: 36 mmol/L (19 @ 18:52)  Creatinine, Random Urine: 112 mg/dL (19 @ 18:52)    Total Protein, Random Urine (19 @ 18:52)    Total Protein, Random Urine: 20 mg/dL                RADIOLOGY & ADDITIONAL STUDIES:    < from: US Abdomen Complete (19 @ 15:58) >  EXAM:  US ABDOMEN COMPLETE                            PROCEDURE DATE:  2019          INTERPRETATION:  CLINICAL STATEMENT: Elevated LFTs. History of   cholecystectomy    TECHNIQUE: Ultrasound of the abdomen.    COMPARISON: CT abdomen and pelvis 2019    FINDINGS:  Fatty liver noted measuring 14.7 cm in length.         The common bile duct is not dilated measuring 6 mm.    The pancreas is not well seen.    The spleen measures 9.9 cm in length.    The right kidney measures 11.9 cm in length.  The left kidney measures   11.8 cm in length. There is no hydronephrosis.    IMPRESSION:  Fatty liver    < end of copied text >  < from: CT Abdomen and Pelvis No Cont (19 @ 14:46) >  EXAM:  CT ABDOMEN AND PELVIS                            PROCEDURE DATE:  2019          INTERPRETATION:  CT of the abdomen and pelvis without IV or oral contrast    Clinical Indication: elevated lipase    Technique: Axial multidetector CT images of the abdomen and pelvis are   acquired without IV or oral contrast.    Comparison: None.    Findings: Limited sections through the lung bases demonstrate small left   atelectasis.    The examination is limited by lack of IV and oral contrast   Cholecystectomy clips are present. Nodular contour of the liver,   suggestive of cirrhosis. Allowing for the noncontrast technique, the   common bile duct, pancreas, spleen, adrenals and kidneys appear grossly   unremarkable.    The appendix appears normal. Small collapsed hiatal hernia versus   possible distal esophageal mural thickening. EGD may be pursued for   further evaluation. No bowel obstruction.    No evidence for free air, ascites, or enlarged lymph node.    The urinary bladder appears grossly unremarkable. The prostate is mildly   enlarged.  < from: CT Abdomen and Pelvis No Cont (19 @ 14:46) >  Impression: No CT evidence for acute pancreatitis.    Nodular contour of the liver, suggestive of cirrhosis.     Small collapsed hiatal hernia versus possible distal esophageal mural   thickening. EGD may be pursued for further evaluation.     < end of copied text >      < end of copied text >

## 2019-07-24 VITALS
TEMPERATURE: 98 F | OXYGEN SATURATION: 99 % | DIASTOLIC BLOOD PRESSURE: 76 MMHG | RESPIRATION RATE: 16 BRPM | SYSTOLIC BLOOD PRESSURE: 109 MMHG | HEART RATE: 78 BPM

## 2019-07-24 LAB
ALBUMIN SERPL ELPH-MCNC: 2.9 G/DL — LOW (ref 3.5–5)
ALDOLASE SERPL-CCNC: 4.4 U/L — SIGNIFICANT CHANGE UP (ref 3.3–10.3)
ALP SERPL-CCNC: 63 U/L — SIGNIFICANT CHANGE UP (ref 40–120)
ALT FLD-CCNC: 65 U/L DA — HIGH (ref 10–60)
ANION GAP SERPL CALC-SCNC: 5 MMOL/L — SIGNIFICANT CHANGE UP (ref 5–17)
AST SERPL-CCNC: 41 U/L — HIGH (ref 10–40)
BASOPHILS # BLD AUTO: 0.02 K/UL — SIGNIFICANT CHANGE UP (ref 0–0.2)
BASOPHILS NFR BLD AUTO: 0.3 % — SIGNIFICANT CHANGE UP (ref 0–2)
BILIRUB SERPL-MCNC: 0.7 MG/DL — SIGNIFICANT CHANGE UP (ref 0.2–1.2)
BUN SERPL-MCNC: 21 MG/DL — HIGH (ref 7–18)
CALCIUM SERPL-MCNC: 7.7 MG/DL — LOW (ref 8.4–10.5)
CHLORIDE SERPL-SCNC: 105 MMOL/L — SIGNIFICANT CHANGE UP (ref 96–108)
CK SERPL-CCNC: 346 U/L — HIGH (ref 35–232)
CO2 SERPL-SCNC: 25 MMOL/L — SIGNIFICANT CHANGE UP (ref 22–31)
CREAT SERPL-MCNC: 0.99 MG/DL — SIGNIFICANT CHANGE UP (ref 0.5–1.3)
EOSINOPHIL # BLD AUTO: 0.06 K/UL — SIGNIFICANT CHANGE UP (ref 0–0.5)
EOSINOPHIL NFR BLD AUTO: 0.8 % — SIGNIFICANT CHANGE UP (ref 0–6)
GLUCOSE SERPL-MCNC: 88 MG/DL — SIGNIFICANT CHANGE UP (ref 70–99)
HCT VFR BLD CALC: 39 % — SIGNIFICANT CHANGE UP (ref 39–50)
HGB BLD-MCNC: 13.2 G/DL — SIGNIFICANT CHANGE UP (ref 13–17)
IMM GRANULOCYTES NFR BLD AUTO: 1.2 % — SIGNIFICANT CHANGE UP (ref 0–1.5)
LYMPHOCYTES # BLD AUTO: 1.99 K/UL — SIGNIFICANT CHANGE UP (ref 1–3.3)
LYMPHOCYTES # BLD AUTO: 27.1 % — SIGNIFICANT CHANGE UP (ref 13–44)
MAGNESIUM SERPL-MCNC: 1.9 MG/DL — SIGNIFICANT CHANGE UP (ref 1.6–2.6)
MCHC RBC-ENTMCNC: 30.3 PG — SIGNIFICANT CHANGE UP (ref 27–34)
MCHC RBC-ENTMCNC: 33.8 GM/DL — SIGNIFICANT CHANGE UP (ref 32–36)
MCV RBC AUTO: 89.4 FL — SIGNIFICANT CHANGE UP (ref 80–100)
MONOCYTES # BLD AUTO: 0.78 K/UL — SIGNIFICANT CHANGE UP (ref 0–0.9)
MONOCYTES NFR BLD AUTO: 10.6 % — SIGNIFICANT CHANGE UP (ref 2–14)
NEUTROPHILS # BLD AUTO: 4.39 K/UL — SIGNIFICANT CHANGE UP (ref 1.8–7.4)
NEUTROPHILS NFR BLD AUTO: 60 % — SIGNIFICANT CHANGE UP (ref 43–77)
NRBC # BLD: 0 /100 WBCS — SIGNIFICANT CHANGE UP (ref 0–0)
PHOSPHATE SERPL-MCNC: 1.5 MG/DL — LOW (ref 2.5–4.5)
PLATELET # BLD AUTO: 153 K/UL — SIGNIFICANT CHANGE UP (ref 150–400)
POTASSIUM SERPL-MCNC: 3.8 MMOL/L — SIGNIFICANT CHANGE UP (ref 3.5–5.3)
POTASSIUM SERPL-SCNC: 3.8 MMOL/L — SIGNIFICANT CHANGE UP (ref 3.5–5.3)
PROT SERPL-MCNC: 5.5 G/DL — LOW (ref 6–8.3)
RBC # BLD: 4.36 M/UL — SIGNIFICANT CHANGE UP (ref 4.2–5.8)
RBC # FLD: 12.6 % — SIGNIFICANT CHANGE UP (ref 10.3–14.5)
SODIUM SERPL-SCNC: 135 MMOL/L — SIGNIFICANT CHANGE UP (ref 135–145)
WBC # BLD: 7.33 K/UL — SIGNIFICANT CHANGE UP (ref 3.8–10.5)
WBC # FLD AUTO: 7.33 K/UL — SIGNIFICANT CHANGE UP (ref 3.8–10.5)

## 2019-07-24 PROCEDURE — 81001 URINALYSIS AUTO W/SCOPE: CPT

## 2019-07-24 PROCEDURE — 82607 VITAMIN B-12: CPT

## 2019-07-24 PROCEDURE — 93005 ELECTROCARDIOGRAM TRACING: CPT

## 2019-07-24 PROCEDURE — 84100 ASSAY OF PHOSPHORUS: CPT

## 2019-07-24 PROCEDURE — 85652 RBC SED RATE AUTOMATED: CPT

## 2019-07-24 PROCEDURE — 83036 HEMOGLOBIN GLYCOSYLATED A1C: CPT

## 2019-07-24 PROCEDURE — 76700 US EXAM ABDOM COMPLETE: CPT

## 2019-07-24 PROCEDURE — 84133 ASSAY OF URINE POTASSIUM: CPT

## 2019-07-24 PROCEDURE — 80053 COMPREHEN METABOLIC PANEL: CPT

## 2019-07-24 PROCEDURE — 84540 ASSAY OF URINE/UREA-N: CPT

## 2019-07-24 PROCEDURE — 84443 ASSAY THYROID STIM HORMONE: CPT

## 2019-07-24 PROCEDURE — 99285 EMERGENCY DEPT VISIT HI MDM: CPT | Mod: 25

## 2019-07-24 PROCEDURE — 80048 BASIC METABOLIC PNL TOTAL CA: CPT

## 2019-07-24 PROCEDURE — 83735 ASSAY OF MAGNESIUM: CPT

## 2019-07-24 PROCEDURE — 86038 ANTINUCLEAR ANTIBODIES: CPT

## 2019-07-24 PROCEDURE — 82570 ASSAY OF URINE CREATININE: CPT

## 2019-07-24 PROCEDURE — 84560 ASSAY OF URINE/URIC ACID: CPT

## 2019-07-24 PROCEDURE — 82550 ASSAY OF CK (CPK): CPT

## 2019-07-24 PROCEDURE — 36415 COLL VENOUS BLD VENIPUNCTURE: CPT

## 2019-07-24 PROCEDURE — 82306 VITAMIN D 25 HYDROXY: CPT

## 2019-07-24 PROCEDURE — 86160 COMPLEMENT ANTIGEN: CPT

## 2019-07-24 PROCEDURE — 80074 ACUTE HEPATITIS PANEL: CPT

## 2019-07-24 PROCEDURE — 87086 URINE CULTURE/COLONY COUNT: CPT

## 2019-07-24 PROCEDURE — 84300 ASSAY OF URINE SODIUM: CPT

## 2019-07-24 PROCEDURE — 74176 CT ABD & PELVIS W/O CONTRAST: CPT

## 2019-07-24 PROCEDURE — 82746 ASSAY OF FOLIC ACID SERUM: CPT

## 2019-07-24 PROCEDURE — 83690 ASSAY OF LIPASE: CPT

## 2019-07-24 PROCEDURE — 84484 ASSAY OF TROPONIN QUANT: CPT

## 2019-07-24 PROCEDURE — 82085 ASSAY OF ALDOLASE: CPT

## 2019-07-24 PROCEDURE — 86255 FLUORESCENT ANTIBODY SCREEN: CPT

## 2019-07-24 PROCEDURE — 86140 C-REACTIVE PROTEIN: CPT

## 2019-07-24 PROCEDURE — 83935 ASSAY OF URINE OSMOLALITY: CPT

## 2019-07-24 PROCEDURE — 80061 LIPID PANEL: CPT

## 2019-07-24 PROCEDURE — 84156 ASSAY OF PROTEIN URINE: CPT

## 2019-07-24 PROCEDURE — 85027 COMPLETE CBC AUTOMATED: CPT

## 2019-07-24 PROCEDURE — 82340 ASSAY OF CALCIUM IN URINE: CPT

## 2019-07-24 PROCEDURE — 82787 IGG 1 2 3 OR 4 EACH: CPT

## 2019-07-24 RX ORDER — POTASSIUM PHOSPHATE, MONOBASIC POTASSIUM PHOSPHATE, DIBASIC 236; 224 MG/ML; MG/ML
15 INJECTION, SOLUTION INTRAVENOUS ONCE
Refills: 0 | Status: COMPLETED | OUTPATIENT
Start: 2019-07-24 | End: 2019-07-24

## 2019-07-24 RX ORDER — CALAMINE AND ZINC OXIDE AND PHENOL 160; 10 MG/ML; MG/ML
1 LOTION TOPICAL
Qty: 1 | Refills: 0
Start: 2019-07-24 | End: 2019-08-06

## 2019-07-24 RX ORDER — TELMISARTAN AND HYDROCHLOROTHIAZIDE 40; 12.5 MG/1; MG/1
1 TABLET ORAL
Qty: 0 | Refills: 0 | DISCHARGE

## 2019-07-24 RX ADMIN — Medication 25 MILLIGRAM(S): at 00:16

## 2019-07-24 NOTE — PROGRESS NOTE ADULT - REASON FOR ADMISSION
generalized body weakness

## 2019-07-24 NOTE — DISCHARGE NOTE PROVIDER - NSDCCPCAREPLAN_GEN_ALL_CORE_FT
PRINCIPAL DISCHARGE DIAGNOSIS  Diagnosis: Rhabdomyolysis  Assessment and Plan of Treatment: You presented with weakness, lightheadedness and feeling unwell at work. according to our evaluation you found to suffer from Rahbdomyolisi which is a condition of muscle tissue breakdown results in the release of protein to the  blood. The protein can damage your kidney . Your CPK level was above 3000 on admission and went down to 349 during your stay.Please avoid any condition that may damage your skeletal muscle , including overexertion. Please FU with your PCP for further evaluation and recommendation.      SECONDARY DISCHARGE DIAGNOSES  Diagnosis: DEE (acute kidney injury)  Assessment and Plan of Treatment: Your kidney function was decreased on admission , most likely in the setting of Rhabdomyolysis and  dehydration. We started Iv fluids and held you BP medication which can potentially damage your kidney . your kidney function went back to baseline over your stay . Please discontinue your blood pressure medication as your blood pressure in well controlled for now. Please follow up with your PCP in 1 week to measure your blood pressure .    Diagnosis: Weakness  Assessment and Plan of Treatment:

## 2019-07-24 NOTE — DISCHARGE NOTE NURSING/CASE MANAGEMENT/SOCIAL WORK - NSDCDPATPORTLINK_GEN_ALL_CORE
You can access the [facility] Patient Portal, offered by [facility], by registering with the following website: [email or web address]

## 2019-07-24 NOTE — PROGRESS NOTE ADULT - ASSESSMENT
1. Atelectasis  - Bronchodilators PRN  - Incentive Spirometry  - Chest PT  - PFTs as OP.     2. Rhabdomyolysis  - Likely 2nd to dehydration; improved.   - CK improved to 346.   - BUN/Creat improved.   - Increase PO fluids - especially when outdoors/working.  - F/U as OP 1-2 weeks for repeat labs.   - Avoid nephrotoxic drugs.   - DVT and GI PPX   - D/C planning    3. Elevated LFTs  - Improved  - CT noted; R/O cirrhosis  - US abd noted.   - F/U as OP 1-2 wk for repeat labs.

## 2019-07-24 NOTE — PROGRESS NOTE ADULT - SUBJECTIVE AND OBJECTIVE BOX
Patient is a 59y old  Male who presents with a chief complaint of generalized body weakness (24 Jul 2019 09:22)    pt seen in icu [  ], reg med floor [   ], bed [  ], chair at bedside [   ], a+o x3 [  ], lethargic [  ],  nad [  ]    perez [  ], ngt [  ], peg [  ], et tube [  ], cent line [  ], picc line [  ]        Allergies    No Known Allergies        Vitals    T(F): 97.8 (07-24-19 @ 08:08), Max: 98.4 (07-23-19 @ 16:05)  HR: 78 (07-24-19 @ 08:08) (70 - 78)  BP: 109/76 (07-24-19 @ 08:08) (109/76 - 126/78)  RR: 16 (07-24-19 @ 08:08) (16 - 16)  SpO2: 99% (07-24-19 @ 08:08) (97% - 100%)  Wt(kg): --  CAPILLARY BLOOD GLUCOSE          Labs                          13.2   7.33  )-----------( 153      ( 24 Jul 2019 06:20 )             39.0       07-24    135  |  105  |  21<H>  ----------------------------<  88  3.8   |  25  |  0.99    Ca    7.7<L>      24 Jul 2019 06:20  Phos  1.5     07-24  Mg     1.9     07-24    TPro  5.5<L>  /  Alb  2.9<L>  /  TBili  0.7  /  DBili  x   /  AST  41<H>  /  ALT  65<H>  /  AlkPhos  63  07-24      CARDIAC MARKERS ( 24 Jul 2019 06:20 )  x     / x     / 346 U/L / x     / x      CARDIAC MARKERS ( 23 Jul 2019 06:42 )  x     / x     / 939 U/L / x     / x      CARDIAC MARKERS ( 22 Jul 2019 11:08 )  <0.015 ng/mL / x     / 3098 U/L / x     / x            .Urine  07-23 @ 00:43   No growth  --  --          Radiology Results      Meds    MEDICATIONS  (STANDING):  ergocalciferol 32024 Unit(s) Oral every week  heparin  Injectable 5000 Unit(s) SubCutaneous every 12 hours  sodium chloride 0.9%. 1000 milliLiter(s) (150 mL/Hr) IV Continuous <Continuous>      MEDICATIONS  (PRN):  calamine Lotion 1 Application(s) Topical every 6 hours PRN Itching  diphenhydrAMINE 25 milliGRAM(s) Oral every 4 hours PRN Rash and/or Itching      Physical Exam    Neuro :  no focal deficits  Respiratory: CTA B/L  CV: RRR, S1S2, no murmurs,   Abdominal: Soft, NT, ND +BS,  Extremities: No edema, + peripheral pulses    ASSESSMENT    Rhabdomyolysis possibly 2nd to dehydration and exertion   harriet resolved   h/o Hypertension, unspecified type  S/P cholecystectomy        PLAN      cont IV fluids  most likely to dehydration as he was working outside over the weekend.   CK improving   ast/alt improving   ct abd noted  abd us noted   bp control  electrolyte management  continue current meds   d/c planning

## 2019-07-24 NOTE — PROGRESS NOTE ADULT - SUBJECTIVE AND OBJECTIVE BOX
Pt is awake, alert, oob in NAD. Feeling better. Planning for D/C today.     INTERVAL HPI/OVERNIGHT EVENTS:      VITAL SIGNS:  T(F): 97.8 (19 @ 08:08)  HR: 78 (19 @ 08:08)  BP: 109/76 (19 @ 08:08)  RR: 16 (19 @ 08:08)  SpO2: 99% (19 @ 08:08)  Wt(kg): --  I&O's Detail          REVIEW OF SYSTEMS:    CONSTITUTIONAL:  No fevers, chills, sweats    HEENT:  Eyes:  No diplopia or blurred vision. ENT:  No earache, sore throat or runny nose.    CARDIOVASCULAR:  No pressure, squeezing, tightness, or heaviness about the chest; no palpitations.    RESPIRATORY:  Per HPI    GASTROINTESTINAL:  No abdominal pain, nausea, vomiting or diarrhea.    GENITOURINARY:  No dysuria, frequency or urgency.    NEUROLOGIC:  No paresthesias, fasciculations, seizures or weakness.    PSYCHIATRIC:  No disorder of thought or mood.      PHYSICAL EXAM:    Constitutional: Well developed and nourished  Eyes:Perrla  ENMT: normal  Neck:supple  Respiratory: good air entry  Cardiovascular: S1 S2 regular  Gastrointestinal: Soft, Non tender  Extremities: No edema  Vascular:normal  Neurological:Awake, alert,Ox3  Musculoskeletal:Normal      MEDICATIONS  (STANDING):  ergocalciferol 34213 Unit(s) Oral every week  heparin  Injectable 5000 Unit(s) SubCutaneous every 12 hours  sodium chloride 0.9%. 1000 milliLiter(s) (150 mL/Hr) IV Continuous <Continuous>    MEDICATIONS  (PRN):  calamine Lotion 1 Application(s) Topical every 6 hours PRN Itching  diphenhydrAMINE 25 milliGRAM(s) Oral every 4 hours PRN Rash and/or Itching      Allergies    No Known Allergies    Intolerances        LABS:                        13.2   7.33  )-----------( 153      ( 2019 06:20 )             39.0         135  |  105  |  21<H>  ----------------------------<  88  3.8   |  25  |  0.99    Ca    7.7<L>      2019 06:20  Phos  1.5     07-24  Mg     1.9     07-24    TPro  5.5<L>  /  Alb  2.9<L>  /  TBili  0.7  /  DBili  x   /  AST  41<H>  /  ALT  65<H>  /  AlkPhos  63  07-24      Urinalysis Basic - ( 2019 11:32 )    Color: Yellow / Appearance: Clear / S.020 / pH: x  Gluc: x / Ketone: Trace  / Bili: Negative / Urobili: Negative   Blood: x / Protein: 30 mg/dL / Nitrite: Negative   Leuk Esterase: Trace / RBC: 0-2 /HPF / WBC 3-5 /HPF   Sq Epi: x / Non Sq Epi: Few /HPF / Bacteria: Few /HPF    Creatine Kinase, Serum in AM (19 @ 06:20)    Creatine Kinase, Serum: 346 U/L        CARDIAC MARKERS ( 2019 06:20 )  x     / x     / 346 U/L / x     / x      CARDIAC MARKERS ( 2019 06:42 )  x     / x     / 939 U/L / x     / x          CAPILLARY BLOOD GLUCOSE            RADIOLOGY & ADDITIONAL TESTS:  < from: US Abdomen Complete (19 @ 15:58) >  IMPRESSION:  Fatty liver    < end of copied text >    CXR:    Ct scan chest:    ekg;    echo:

## 2019-07-24 NOTE — DISCHARGE NOTE PROVIDER - HOSPITAL COURSE
HPI:    58 yo male with PMH of HTN presented to the ED for weakness, lightheadedness and feeling unwell at work today. He is a , worked out doors over the weekend. He denies any pain in particular. He denies any similar complains in the past.      Patient reports 1 week ago he was in [facility] for muscle aches and back aches, had a CAT scan and was informed he had a UTI. Patient was given 2 abx pills and a prescription to the pharmacy but was not able to fill prescription because the pharmacy was closed. Patient denies urinary symptoms, chest pain or any other complaints. He took Advil for back pain.     He complains of rash which developed this morning. Rash associated with itching present on b/l Shoulders and back.     He was told a year and half ago that he has kidney problem but he does not know his baseline creat. (22 Jul 2019 14:20)        CPk on admission wa above 3k , Cr :2.7 , increased LFT , most likely 2/2 rhabdomyolysis. Na level on admission was 127 most llikely 2/2 dehydartion. We held Thelmasartan/hctz  medication in setting of DEE.     Problem: Rhabdomyolysis.  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 started with 2 L NS in ED continue dwith IV fluids 150 , cw 150/hr. over the course of stay CPk trended down to 349 and liver and kidney function went back to its baseline. Electrolytes were replated.     On Ultrasound fatty liver was reported and cirrhosis on Ct scan , patient is a previous alcohol abuser. Hepatitis panel came back non reactive. Recommended to FU with GI as OP.    Patient's BP was controlled over the course of admission , advised to Dc his BP medication and FU in 1 week with his PCP to monitor BP and further recommendation. As per Nephro consult patient should avoid ACe ARB for BP modification.     The patient was discharged home in stable condition to be followed up as an outpatient.

## 2019-07-25 LAB
ANA TITR SER: NEGATIVE — SIGNIFICANT CHANGE UP
IGG SERPL-MCNC: 759 MG/DL — SIGNIFICANT CHANGE UP (ref 700–1600)
IGG1 SER-MCNC: 339 MG/DL — SIGNIFICANT CHANGE UP (ref 248–810)
IGG2 SER-MCNC: 451 MG/DL — SIGNIFICANT CHANGE UP (ref 130–555)
IGG3 SER-MCNC: 15 MG/DL — SIGNIFICANT CHANGE UP (ref 15–102)
IGG4 SER-MCNC: 41 MG/DL — SIGNIFICANT CHANGE UP (ref 2–96)
SMOOTH MUSCLE AB SER-ACNC: SIGNIFICANT CHANGE UP

## 2020-11-10 PROBLEM — Z00.00 ENCOUNTER FOR PREVENTIVE HEALTH EXAMINATION: Status: ACTIVE | Noted: 2020-11-10

## 2025-08-18 ENCOUNTER — EMERGENCY (EMERGENCY)
Facility: HOSPITAL | Age: 65
LOS: 1 days | End: 2025-08-18
Payer: COMMERCIAL

## 2025-08-18 DIAGNOSIS — Z90.49 ACQUIRED ABSENCE OF OTHER SPECIFIED PARTS OF DIGESTIVE TRACT: Chronic | ICD-10-CM

## 2025-08-18 PROCEDURE — L9992: CPT

## 2025-08-19 PROBLEM — I10 ESSENTIAL (PRIMARY) HYPERTENSION: Chronic | Status: ACTIVE | Noted: 2019-07-22
